# Patient Record
Sex: MALE | Race: WHITE | Employment: FULL TIME | URBAN - NONMETROPOLITAN AREA
[De-identification: names, ages, dates, MRNs, and addresses within clinical notes are randomized per-mention and may not be internally consistent; named-entity substitution may affect disease eponyms.]

---

## 2021-12-18 ENCOUNTER — HOSPITAL ENCOUNTER (EMERGENCY)
Age: 26
Discharge: HOME | End: 2021-12-18
Payer: COMMERCIAL

## 2021-12-18 VITALS
RESPIRATION RATE: 18 BRPM | TEMPERATURE: 98 F | BODY MASS INDEX: 26.95 KG/M2 | WEIGHT: 210 LBS | HEART RATE: 69 BPM | DIASTOLIC BLOOD PRESSURE: 89 MMHG | SYSTOLIC BLOOD PRESSURE: 144 MMHG | HEIGHT: 74 IN | OXYGEN SATURATION: 98 %

## 2021-12-18 DIAGNOSIS — K08.89 ODONTALGIA: Primary | ICD-10-CM

## 2021-12-18 PROCEDURE — 99202 OFFICE O/P NEW SF 15 MIN: CPT | Performed by: NURSE PRACTITIONER

## 2021-12-18 PROCEDURE — 99203 OFFICE O/P NEW LOW 30 MIN: CPT

## 2021-12-18 RX ORDER — TRAMADOL HYDROCHLORIDE 50 MG/1
50 TABLET ORAL EVERY 6 HOURS PRN
Qty: 12 TABLET | Refills: 0 | Status: SHIPPED | OUTPATIENT
Start: 2021-12-18 | End: 2021-12-21

## 2021-12-18 RX ORDER — AMOXICILLIN 875 MG/1
875 TABLET, COATED ORAL 2 TIMES DAILY
Qty: 20 TABLET | Refills: 0 | Status: SHIPPED | OUTPATIENT
Start: 2021-12-18 | End: 2021-12-28

## 2021-12-18 RX ORDER — IBUPROFEN 800 MG/1
800 TABLET ORAL EVERY 8 HOURS PRN
Qty: 30 TABLET | Refills: 0 | Status: SHIPPED | OUTPATIENT
Start: 2021-12-18

## 2021-12-18 ASSESSMENT — PAIN SCALES - GENERAL: PAINLEVEL_OUTOF10: 9

## 2021-12-18 ASSESSMENT — PAIN DESCRIPTION - FREQUENCY: FREQUENCY: CONTINUOUS

## 2021-12-18 ASSESSMENT — PAIN DESCRIPTION - LOCATION: LOCATION: TEETH

## 2021-12-18 ASSESSMENT — PAIN DESCRIPTION - PROGRESSION: CLINICAL_PROGRESSION: GRADUALLY WORSENING

## 2021-12-18 ASSESSMENT — PAIN DESCRIPTION - PAIN TYPE: TYPE: ACUTE PAIN

## 2021-12-18 ASSESSMENT — PAIN DESCRIPTION - DESCRIPTORS: DESCRIPTORS: THROBBING

## 2021-12-18 ASSESSMENT — PAIN DESCRIPTION - ORIENTATION: ORIENTATION: RIGHT;UPPER

## 2021-12-18 NOTE — ED PROVIDER NOTES
[facility]  Urgent Care Encounter     CHIEF COMPLAINT    Chief Complaint   Patient presents with    Dental Pain     right upper       Nursing Notes, Past Medical Hx, Past Surgical Hx, Social Hx, Allergies, and Family Hx were all reviewed and agreed with, or any disagreements were addressed in the HPI. HPI    Kathleen Pennington is a 23 y.o. male who presents with complaints of right-sided upper tooth pain. The pain has been going on for 1 day. The pain is worsened by nothing. Denies any trauma. Patient states he does have a local dentist but has not made any appointment yet for follow-up. REVIEW OF SYSTEMS    Constitutional:  Denies fever, chills, or weakness   Eyes:  Denies photophobia or visual changes  HENT:  Denies sore throat    Respiratory:  Denies cough or shortness of breath   Lymphatic:  Denies swollen glands   All other systems negative except as marked. PAST MEDICAL HISTORY   History reviewed. No pertinent past medical history. SURGICAL HISTORY     Patient  has no past surgical history on file. CURRENT MEDICATIONS       Discharge Medication List as of 12/18/2021  9:29 AM      CONTINUE these medications which have NOT CHANGED    Details   ondansetron (ZOFRAN ODT) 4 MG disintegrating tablet Take 1 tablet by mouth every 8 hours as needed for Nausea or Vomiting, Disp-20 tablet, R-0      omeprazole (PRILOSEC) 20 MG capsule Take 1 capsule by mouth daily, Disp-30 capsule, R-0      dicyclomine (BENTYL) 10 MG capsule Take 1 capsule by mouth 4 times daily (before meals and nightly), Disp-120 capsule, R-0             ALLERGIES     Patient is has No Known Allergies. FAMILY HISTORY     Patient's family history is not on file. SOCIAL HISTORY     Patient  reports that he has never smoked. He has never used smokeless tobacco. He reports current alcohol use. He reports that he does not use drugs.     PHYSICAL EXAM    VITAL SIGNS: BP (!) 144/89   Pulse 69   Temp 98 °F (36.7 °C) (Tympanic)   Resp 18   Ht 6' 2\" (1.88 m)   Wt 210 lb (95.3 kg)   SpO2 98%   BMI 26.96 kg/m²    Constitutional:  Well developed, Well nourished, No acute distress, Non-toxic appearance. HENT:  Normocephalic, Atraumatic, Oropharynx moist, Teeth -approximate #29 tooth. Nose normal. Neck- Normal range of motion, No tenderness, Supple, No stridor. EACs normal.  Eyes:  PERRL, EOMI, Conjunctiva normal, No discharge. Respiratory:  Normal breath sounds, No respiratory distress, No wheezing, No chest tenderness. Integument:  Warm, Dry, No erythema, No rash. Lymphatic:  No lymphadenopathy noted. Neurologic:  Alert & oriented x 3  Psychiatric:  Affect normal, Judgment normal, Mood normal.     Periodontal Exam         Does not appear to have any dental caries or dental fractures noted. Slight tenderness to the gingival tissue. FINAL IMPRESSION    1. Odontalgia      DISPOSITION/PLAN      This patient's condition is not warranting any type of surgical intervention at this time. He can be treated in an outpatient setting with pain medication and antibiotics. The patient was also advised to eat a soft diet, chew on the opposite side of pain, Use a soft bristle toothbrush and warm saltwater lavage after eating. The patient was advised to take the medication as directed. The patient was also advised to follow-up with a local dentist ASAP. We also discussed returning to the Emergency Department immediately if new or worsening symptoms occur. We have discussed the symptoms which are most concerning that necessitate immediate return.       PATIENT REFERRED TO:  Shakira Person DDS  [address] [place] [address]  [phone]    Schedule an appointment as soon as possible for a visit in 1 week  For recheck and further evaluation and management    DISCHARGE MEDICATIONS:  Discharge Medication List as of 12/18/2021  9:29 AM      START taking these medications    Details   amoxicillin (AMOXIL) 875 MG tablet Take 1 tablet by mouth 2 times daily for 10 days, Disp-20 tablet, R-0Normal      ibuprofen (ADVIL;MOTRIN) 800 MG tablet Take 1 tablet by mouth every 8 hours as needed for Pain, Disp-30 tablet, R-0Normal      traMADol (ULTRAM) 50 MG tablet Take 1 tablet by mouth every 6 hours as needed for Pain for up to 3 days. , Disp-12 tablet, R-0Normal           Discharge Medication List as of 12/18/2021  9:29 AM        Controlled Substance Monitoring:    Acute and Chronic Pain Monitoring:   RX Monitoring 12/18/2021   Periodic Controlled Substance Monitoring No signs of potential drug abuse or diversion identified. ;Possible medication side effects, risk of tolerance/dependence & alternative treatments discussed. Patient give standard precautions with this class of medications such as may cause drowsiness. May impair ability to operate vehicles or machinery. Do not use in combination with alcohol while taking this medication                       Supportive Documentation of Scheduled Medication    1. Patient's medical history, current medications,and those prescribed by other   providers and [facility], were reviewed by me. 2. Physical exam revealed see assessment portion of chart. 3. Diagnosis: odontalgia    4. Contraindications were ruled out, no current pain medication usage, no drug interactions with current medication, no documented history of substance abuse or inpatient/outpatient rehabilitation. 5. Rationale for prescription(s): Tramadol 50 mg 1 p.o. every 6 hours #12 no refills for breakthrough pain.         [facility], APRN - CNP  12/18/21 [facility] APRN - CNP  12/18/21 1412

## 2021-12-18 NOTE — ED NOTES
Patient presents to [facility] with complaints of right upper dental pain that started yesterday morning while patient was at work. Patient states the pain is gradually getting worse, no injury noted.       Malathi Jimenez RN  12/18/21 [id number]

## 2025-01-24 ENCOUNTER — HOSPITAL ENCOUNTER (EMERGENCY)
Age: 30
Discharge: HOME | End: 2025-01-24
Payer: COMMERCIAL

## 2025-01-24 VITALS
OXYGEN SATURATION: 96 % | RESPIRATION RATE: 18 BRPM | HEART RATE: 71 BPM | TEMPERATURE: 98.3 F | SYSTOLIC BLOOD PRESSURE: 122 MMHG | DIASTOLIC BLOOD PRESSURE: 81 MMHG

## 2025-01-24 DIAGNOSIS — H10.31 ACUTE BACTERIAL CONJUNCTIVITIS OF RIGHT EYE: Primary | ICD-10-CM

## 2025-01-24 PROCEDURE — 99202 OFFICE O/P NEW SF 15 MIN: CPT | Performed by: NURSE PRACTITIONER

## 2025-01-24 PROCEDURE — 99203 OFFICE O/P NEW LOW 30 MIN: CPT

## 2025-01-24 RX ORDER — TOBRAMYCIN 3 MG/ML
1 SOLUTION/ DROPS OPHTHALMIC EVERY 4 HOURS
Qty: 5 ML | Refills: 0 | Status: SHIPPED | OUTPATIENT
Start: 2025-01-24 | End: 2025-02-03

## 2025-01-24 ASSESSMENT — PAIN - FUNCTIONAL ASSESSMENT: PAIN_FUNCTIONAL_ASSESSMENT: NONE - DENIES PAIN

## 2025-01-24 ASSESSMENT — ENCOUNTER SYMPTOMS
EYE REDNESS: 1
EYE PAIN: 0
PHOTOPHOBIA: 0
RHINORRHEA: 0
SORE THROAT: 0
EYE ITCHING: 1
COUGH: 0
EYE DISCHARGE: 0

## 2025-01-24 NOTE — ED PROVIDER NOTES
[facility]  Urgent Care Encounter       CHIEF COMPLAINT       Chief Complaint   Patient presents with    Eye Problem       Nurses Notes reviewed and I agree except as noted in the HPI.  HISTORY OF PRESENT ILLNESS   Jorge Hyde is a 29 y.o. male who presents with complaints of right eye redness and irritation.  This started this morning when he woke up.  Denies any drainage or fever.  No complaints of a headache, blurry vision, or double vision.  Has not tried any treatment.  Has been exposed to his daughter who has similar symptoms.    The history is provided by the patient.       REVIEW OF SYSTEMS     Review of Systems   Constitutional:  Negative for fever.   HENT:  Negative for congestion, rhinorrhea and sore throat.    Eyes:  Positive for redness and itching. Negative for photophobia, pain, discharge and visual disturbance.   Respiratory:  Negative for cough.    Neurological:  Negative for headaches.       PAST MEDICAL HISTORY   History reviewed. No pertinent past medical history.    SURGICALHISTORY     Patient  has no past surgical history on file.    CURRENT MEDICATIONS       Previous Medications    No medications on file       ALLERGIES     Patient is has No Known Allergies.    Patients   There is no immunization history on file for this patient.    FAMILY HISTORY     Patient's family history is not on file.    SOCIAL HISTORY     Patient  reports that he has never smoked. He has never used smokeless tobacco. He reports current alcohol use. He reports that he does not use drugs.    PHYSICAL EXAM     ED TRIAGE VITALS  BP: 122/81, Temp: 98.3 °F (36.8 °C), Pulse: 71, Respirations: 18, SpO2: 96 %,Estimated body mass index is 26.96 kg/m² as calculated from the following:    Height as of 12/18/21: 1.88 m (6' 2\").    Weight as of 12/18/21: 95.3 kg (210 lb).,No LMP for male patient.    Physical Exam  Vitals and nursing note reviewed.   Constitutional:       General: He is not in acute